# Patient Record
(demographics unavailable — no encounter records)

---

## 2024-10-15 NOTE — HISTORY OF PRESENT ILLNESS
[de-identified] : Hearing and vision screening [FreeTextEntry6] : Stuart was not cooperative with vision and hearing screenings at his 4 year [facility] visit in March 2024. Screenings were performed successfully today.  His vision is L 20/50, R 2040 and Both 10/40. Ophthalmology referral given for full exam. Hearing screen was WNL.  Screening test results were added to [facility] school form completed by Dr. Howell in March 2024. Copy of screening results and vaccine record [place] to [facility]

## 2025-03-27 NOTE — REASON FOR VISIT
[Routine Follow-Up] : a routine follow-up visit for [Anaphylaxis] : anaphylaxis [Allergy Evaluation/ Skin Testing] : allergy evaluation and or skin testing [Allergic Rhinitis] : allergic rhinitis [Allergic Conjunctivitis] : allergic conjunctivitis [Hay Fever] : hay fever [Congestion] : congestion [Runny Nose] : runny nose [Itchy Eyes] : itchy eyes [To Food] : allergy to food [Father] : father

## 2025-03-30 NOTE — REVIEW OF SYSTEMS
[Nasal Congestion] : nasal congestion [Snoring] : snoring [Dry Skin] : dry skin [Itching] : itching [Negative] : Genitourinary

## 2025-03-30 NOTE — DEVELOPMENTAL MILESTONES
[Normal Development] : Normal Development [None] : none [Dresses and undresses without help] : dresses and undresses without help [Goes to the bathroom independently] : goes to bathroom independently [Plays and interacts with peer] : plays and interacts with peer [Tells a story of 2 sentences or more] : tells a story of 2 sentences or more [Counts 5 objects] : counts 5 objects [Walks on tiptoes when asked] : walks on tiptoes when asked [Copies first name] : copies first name [Writes 2 or more letters] : writes 2 or more letters [Is dry through the day] :  is dry through the day [Answers "why" questions] : answers "why" questions [Catches a bounced ball with] : catches a bounced ball with 2 hands [No] : Not Completed. [Is beginning to skip] : is not beginning to skip [FreeTextEntry1] : hops and balances on one foot knows simple addition

## 2025-03-30 NOTE — HISTORY OF PRESENT ILLNESS
[whole ___ oz/d] : consumes [unfilled] oz of whole cow's milk per day [Fruit] : fruit [Vegetables] : vegetables [Meat] : meat [Dairy] : dairy [Normal] : Normal [___ stools per day] : [unfilled]  stools per day [Toilet Trained] :  toilet trained [Brushing teeth] : Brushing teeth [Yes] : Patient goes to dentist yearly [Toothpaste] : Primary Fluoride Source: Toothpaste [Appropiate parent-child-sibling interaction] : Appropriate parent-child-sibling interaction [Child Cooperates] : Child cooperates [Parent has appropriate responses to behavior] : Parent has appropriate responses to behavior [In Pre-K] : In Pre-K [Car seat in back seat] : Car seat in back seat [Up to date] : Up to date [Mother] : mother [No] : No cigarette smoke exposure [Supervised outdoor play] : Supervised outdoor play [Exposure to electronic nicotine delivery system] : No exposure to electronic nicotine delivery system [FreeTextEntry7] : no interval ER/UC visits; no ear infections in the last year [de-identified] : diet has improved significantly, eats 3 meals/day drinks juice 4 oz/day [FreeTextEntry3] : chronic loud snoring, no LISA sx [de-identified] : 1-2x/day [de-identified] : does well in school [de-identified] : uses helmet [FreeTextEntry1] :  Egg allergy: Passed supervised oral challenge to baked egg in 2023 No interval episodes of anaphylaxis  No other food allergies  Eczema: Vaseline and Vanicream moisturizers Topical steroids (TAC 0.1%) on body and Tacrolimus on face as needed for flares  Fragrance-free soap and detergent  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

## 2025-03-30 NOTE — HISTORY OF PRESENT ILLNESS
[whole ___ oz/d] : consumes [unfilled] oz of whole cow's milk per day [Fruit] : fruit [Vegetables] : vegetables [Meat] : meat [Dairy] : dairy [Normal] : Normal [___ stools per day] : [unfilled]  stools per day [Toilet Trained] :  toilet trained [Brushing teeth] : Brushing teeth [Yes] : Patient goes to dentist yearly [Toothpaste] : Primary Fluoride Source: Toothpaste [Appropiate parent-child-sibling interaction] : Appropriate parent-child-sibling interaction [Child Cooperates] : Child cooperates [Parent has appropriate responses to behavior] : Parent has appropriate responses to behavior [In Pre-K] : In Pre-K [Car seat in back seat] : Car seat in back seat [Up to date] : Up to date [Mother] : mother [No] : No cigarette smoke exposure [Supervised outdoor play] : Supervised outdoor play [Exposure to electronic nicotine delivery system] : No exposure to electronic nicotine delivery system [FreeTextEntry7] : no interval ER/UC visits; no ear infections in the last year [de-identified] : diet has improved significantly, eats 3 meals/day drinks juice 4 oz/day [FreeTextEntry3] : chronic loud snoring, no LISA sx [de-identified] : 1-2x/day [de-identified] : does well in school [de-identified] : uses helmet [FreeTextEntry1] :  Egg allergy: Passed supervised oral challenge to baked egg in 2023 No interval episodes of anaphylaxis  No other food allergies  Eczema: Vaseline and Vanicream moisturizers Topical steroids (TAC 0.1%) on body and Tacrolimus on face as needed for flares  Fragrance-free soap and detergent  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

## 2025-03-30 NOTE — PHYSICAL EXAM
[Alert] : alert [No Acute Distress] : no acute distress [Playful] : playful [Normocephalic] : normocephalic [PERRL] : PERRL [EOMI Bilateral] : EOMI bilateral [Auricles Well Formed] : auricles well formed [Clear Tympanic membranes with present light reflex and bony landmarks] : clear tympanic membranes with present light reflex and bony landmarks [No Discharge] : no discharge [Uvula Midline] : uvula midline [Nonerythematous Oropharynx] : nonerythematous oropharynx [Supple, full passive range of motion] : supple, full passive range of motion [No Palpable Masses] : no palpable masses [Symmetric Chest Rise] : symmetric chest rise [Clear to Auscultation Bilaterally] : clear to auscultation bilaterally [Normoactive Precordium] : normoactive precordium [Regular Rate and Rhythm] : regular rate and rhythm [Normal S1, S2 present] : normal S1, S2 present [No Murmurs] : no murmurs [NonTender] : non tender [Non Distended] : non distended [Normoactive Bowel Sounds] : normoactive bowel sounds [Kemal 1] : Kemal 1 [Uncircumcised] : uncircumcised [Central Urethral Opening] : central urethral opening [Testicles Descended Bilaterally] : testicles descended bilaterally [Soft] : soft [Non Tender] : non tender [Mobile] : mobile [< 1 cm Lymph Nodes Palpated in the following Regions:] : <1 cm lymph nodes palpated in the following regions: [Anterior Cervical] : anterior cervical [Posterior Cervical] : posterior cervical [Symmetric Hip Rotation] : symmetric hip rotation [No pain or deformities with palpation of bone, muscles, joints] : no pain or deformities with palpation of bone, muscles, joints [Normal Muscle Tone] : normal muscle tone [Straight] : straight [Conjunctivae with no discharge] : conjunctivae with no discharge [FreeTextEntry1] : adorable, conversant  [FreeTextEntry4] : pale nasal turbinates  [de-identified] : tonsils not enlarged [FreeTextEntry9] : 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

## 2025-03-30 NOTE — DISCUSSION/SUMMARY
[Normal Development] : development  [No Elimination Concerns] : elimination [Continue Regimen] : feeding [Poor Weight Gain] : poor weight gain [BMI ___] : body mass index of [unfilled] [Eczema] : eczema [No Vaccines] : no vaccines needed [No Medication Changes] : No medication changes at this time [Mother] : mother [Full Activity without restrictions including Physical Education & Athletics] : Full Activity without restrictions including Physical Education & Athletics [I have examined the above-named student and completed the preparticipation physical evaluation. The athlete does not present apparent clinical contraindications to practice and participate in sport(s) as outlined above. A copy of the physical exam is on r] : I have examined the above-named student and completed the preparticipation physical evaluation. The athlete does not present apparent clinical contraindications to practice and participate in sport(s) as outlined above. A copy of the physical exam is on record in my office and can be made available to the school at the request of the parents. If conditions arise after the athlete has been cleared for participation, the physician may rescind the clearance until the problem is resolved and the potential consequences are completely explained to the athlete (and parents/guardians). [FreeTextEntry1] :  Stephanie 5 year old boy with eczema, allergic rhinitis, egg allergy PMH of anaphylaxis to egg, passed supervised baked egg challenge in 2023, avoids lightly-cooked egg Followed by private ENT for recurrent AOM and snoring, no plan for surgery; no ear infections in the last year, normal hearing testing today Hx of poor weight gain and underweight BMI, likely due to inconsistent eating (though this has reportedly improved) Developing appropriately Ophtho referral (due to failed vision screen at last appt), normal vision acuity but reportedly dx with red-green color blindness Eczema is adequately controlled with current regimen as per Derm Evidence of allergic rhinitis on exam Exam notable for scattered erythematous subcutaneous nodules c/w furunculosis (recurrent per mother)  1) Health maintenance - Dietary counseling provided, incorporate healthy fats and calorie-dense foods  - Discussed child safety - Routine F/U with dentist and ophtho  - Routine CBC and lead testing - Recommend Flu & COVID vaccines in the fall  2) Eczema - [place] use of gentle emollients - [place] use of topical steroids and tacrolimus as needed for flares - Use mupirocin 3x/day PRN skin pustules/nodules - Recommend MRSA decolonization with mupirocin to nares 2x/day for 7-10 days - F/U with Derm  3) Egg allergy - Continue avoidance of lightly-cooked egg - F/U with Allergy (scheduled appt tomorrow) - Allergy MAF completed and provided to parent  4) Allergic rhinitis - Continue Zyrtec and Flonase PRN - F/U with Allergy  5) Recurrent AOM (no episodes in past 12 months) - F/U with private ENT  Return in 6 months for weight check and vaccines (Flu & COVID)

## 2025-03-30 NOTE — PHYSICAL EXAM
[Alert] : alert [No Acute Distress] : no acute distress [Playful] : playful [Normocephalic] : normocephalic [PERRL] : PERRL [EOMI Bilateral] : EOMI bilateral [Auricles Well Formed] : auricles well formed [Clear Tympanic membranes with present light reflex and bony landmarks] : clear tympanic membranes with present light reflex and bony landmarks [No Discharge] : no discharge [Uvula Midline] : uvula midline [Nonerythematous Oropharynx] : nonerythematous oropharynx [Supple, full passive range of motion] : supple, full passive range of motion [No Palpable Masses] : no palpable masses [Symmetric Chest Rise] : symmetric chest rise [Clear to Auscultation Bilaterally] : clear to auscultation bilaterally [Normoactive Precordium] : normoactive precordium [Regular Rate and Rhythm] : regular rate and rhythm [Normal S1, S2 present] : normal S1, S2 present [No Murmurs] : no murmurs [NonTender] : non tender [Non Distended] : non distended [Normoactive Bowel Sounds] : normoactive bowel sounds [Kemal 1] : Kemal 1 [Uncircumcised] : uncircumcised [Central Urethral Opening] : central urethral opening [Testicles Descended Bilaterally] : testicles descended bilaterally [Soft] : soft [Non Tender] : non tender [Mobile] : mobile [< 1 cm Lymph Nodes Palpated in the following Regions:] : <1 cm lymph nodes palpated in the following regions: [Anterior Cervical] : anterior cervical [Posterior Cervical] : posterior cervical [Symmetric Hip Rotation] : symmetric hip rotation [No pain or deformities with palpation of bone, muscles, joints] : no pain or deformities with palpation of bone, muscles, joints [Normal Muscle Tone] : normal muscle tone [Straight] : straight [Conjunctivae with no discharge] : conjunctivae with no discharge [FreeTextEntry1] : adorable, conversant  [FreeTextEntry4] : pale nasal turbinates  [de-identified] : tonsils not enlarged [FreeTextEntry9] : 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

## 2025-03-30 NOTE — DISCUSSION/SUMMARY
[Normal Development] : development  [No Elimination Concerns] : elimination [Continue Regimen] : feeding [Poor Weight Gain] : poor weight gain [BMI ___] : body mass index of [unfilled] [Eczema] : eczema [No Vaccines] : no vaccines needed [No Medication Changes] : No medication changes at this time [Mother] : mother [Full Activity without restrictions including Physical Education & Athletics] : Full Activity without restrictions including Physical Education & Athletics [I have examined the above-named student and completed the preparticipation physical evaluation. The athlete does not present apparent clinical contraindications to practice and participate in sport(s) as outlined above. A copy of the physical exam is on r] : I have examined the above-named student and completed the preparticipation physical evaluation. The athlete does not present apparent clinical contraindications to practice and participate in sport(s) as outlined above. A copy of the physical exam is on record in my office and can be made available to the school at the request of the parents. If conditions arise after the athlete has been cleared for participation, the physician may rescind the clearance until the problem is resolved and the potential consequences are completely explained to the athlete (and parents/guardians). [FreeTextEntry1] :  Stephanie 5 year old boy with eczema, allergic rhinitis, egg allergy PMH of anaphylaxis to egg, passed supervised baked egg challenge in 2023, avoids lightly-cooked egg Followed by private ENT for recurrent AOM and snoring, no plan for surgery; no ear infections in the last year, normal hearing testing today Hx of poor weight gain and underweight BMI, likely due to inconsistent eating (though this has reportedly improved) Developing appropriately Ophtho referral (due to failed vision screen at last appt), normal vision acuity but reportedly dx with red-green color blindness Eczema is adequately controlled with current regimen as per Derm Evidence of allergic rhinitis on exam Exam notable for scattered erythematous subcutaneous nodules c/w furunculosis (recurrent per mother)  1) Health maintenance - Dietary counseling provided, incorporate healthy fats and calorie-dense foods  - Discussed child safety - Routine F/U with dentist and ophtho  - Routine CBC and lead testing - Recommend Flu & COVID vaccines in the fall  2) Eczema - [place] use of gentle emollients - Sparing use of topical steroids and tacrolimus as needed for flares - Use mupirocin 3x/day PRN skin pustules/nodules - Recommend MRSA decolonization with mupirocin to nares 2x/day for 7-10 days - F/U with Derm  3) Egg allergy - Continue avoidance of lightly-cooked egg - F/U with Allergy (scheduled appt tomorrow) - Allergy MAF completed and provided to parent  4) Allergic rhinitis - Continue Zyrtec and Flonase PRN - F/U with Allergy  5) Recurrent AOM (no episodes in past 12 months) - F/U with private ENT  Return in 6 months for weight check and vaccines (Flu & COVID)

## 2025-03-31 NOTE — REVIEW OF SYSTEMS
[Immunizations are up to date] : Immunizations are up to date [Eye Itching] : itchy eyes [Nasal Congestion] : nasal congestion [Nasal Itching] : nasal itching [Sneezing] : sneezing [Atopic Dermatitis] : atopic dermatitis [Dry Skin] : ~L dry skin [Recurrent Skin Infections] : recurrent skin infections [Nl] : Gastrointestinal [Urticaria] : no urticaria [de-identified] : prone to furunculosis - 2 resolving spots currently

## 2025-03-31 NOTE — PHYSICAL EXAM
[Alert] : alert [Well Nourished] : well nourished [Healthy Appearance] : healthy appearance [No Acute Distress] : no acute distress [Normal Pupil & Iris Size/Symmetry] : normal pupil and iris size and symmetry [No Discharge] : no discharge [Sclera Not Icteric] : sclera not icteric [Normal TMs] : both tympanic membranes were normal [Normal Lips/Tongue] : the lips and tongue were normal [Normal Outer Ear/Nose] : the ears and nose were normal in appearance [Normal Tonsils] : normal tonsils [Boggy Nasal Turbinates] : boggy and/or pale nasal turbinates [Normal Rate and Effort] : normal respiratory rhythm and effort [Normal Rate] : heart rate was normal  [Normal S1, S2] : normal S1 and S2 [Skin Intact] : skin intact  [Patches] : ~M patches present [Alert, Awake, Oriented as Age-Appropriate] : alert, awake, oriented as age appropriate [Conjunctival Erythema] : no conjunctival erythema [Suborbital Bogginess] : no suborbital bogginess (allergic shiners) [Pharyngeal erythema] : no pharyngeal erythema [Posterior Pharyngeal Cobblestoning] : no posterior pharyngeal cobblestoning [Wheezing] : no wheezing was heard [Urticaria] : no urticaria

## 2025-03-31 NOTE — HISTORY OF PRESENT ILLNESS
[Asthma] : asthma [Cat] : cat [de-identified] : Stuart is a 5y M with IgE allergy to egg and seasonal rhino-conjunctivitis.      Food allergy: - no ER visits - possibly accidental exposure in fried food, does ok with few bites, no reactions - only avoiding egg - still tolerating baked egg in cookies, cake,   seasonal allergies: - symptoms including the itchy eyes, runny nose, congestion, eczema flares - started giving zyrtec for the spring season 2.5mL, is using flonase daily - used to do eye drops   eczema - been bad on hands during winter time but improving now with vaseline - also having furunculosis, followed by dermatology [de-identified] : egg

## 2025-03-31 NOTE — SOCIAL HISTORY
[Apartment] : [unfilled] lives in an apartment  [None] : none [Feather Pillows] : does not have feather pillows [Bedroom] : not in the bedroom [de-identified] : [place]

## 2025-03-31 NOTE — SOCIAL HISTORY
[Apartment] : [unfilled] lives in an apartment  [None] : none [Feather Pillows] : does not have feather pillows [Bedroom] : not in the bedroom [de-identified] : [place]

## 2025-03-31 NOTE — REVIEW OF SYSTEMS
[Immunizations are up to date] : Immunizations are up to date [Eye Itching] : itchy eyes [Nasal Congestion] : nasal congestion [Nasal Itching] : nasal itching [Sneezing] : sneezing [Atopic Dermatitis] : atopic dermatitis [Dry Skin] : ~L dry skin [Recurrent Skin Infections] : recurrent skin infections [Nl] : Gastrointestinal [Urticaria] : no urticaria [de-identified] : prone to furunculosis - 2 resolving spots currently

## 2025-03-31 NOTE — HISTORY OF PRESENT ILLNESS
[Asthma] : asthma [Cat] : cat [de-identified] : Stuart is a 5y M with IgE allergy to egg and seasonal rhino-conjunctivitis.      Food allergy: - no ER visits - possibly accidental exposure in fried food, does ok with few bites, no reactions - only avoiding egg - still tolerating baked egg in cookies, cake,   seasonal allergies: - symptoms including the itchy eyes, runny nose, congestion, eczema flares - started giving zyrtec for the spring season 2.5mL, is using flonase daily - used to do eye drops   eczema - been bad on hands during winter time but improving now with vaseline - also having furunculosis, followed by dermatology [de-identified] : egg

## 2025-04-14 NOTE — ASSESSMENT
[Use of independent historian: [ enter independent historian's relationship to patient ] :____] : As the patient was unable to provide a complete and reliable history, I obtained clinical history from the patient's [unfilled] [FreeTextEntry1] : #Favor Furunculosis, trunk chronic, flare - c/w CLN cleanser - can c/w mupirocin - recommend bacterial culture- discussed will schedule for in person appt to do so, but results may be false negative as we are already starting an abx and has used mupirocin - start keflex 30mg/kg (3.5mL of the 250mg/5mL = 3.5 mL TID) x 7 days, SED - does not appear as molluscum as pustules visible, but will confirm in person. tasked scheduling team for in person appt in 2-3 weeks  RTC 2-3 weeks  This visit was conducted via live synchronous audio/video telehealth with the patient and provider. The patient attested to being located in [facility] where the provider is also currently located and licensed to practice medicine. Verbal consent was obtained for this telemedicine encounter. Risks and benefits of using Telehealth services has been discussed with the patient. The patient has been given ample opportunity to discuss any questions regarding [facility]'s Telehealth services. All of the patient's questions were answered to his/her satisfaction.

## 2025-04-14 NOTE — HISTORY OF PRESENT ILLNESS
[FreeTextEntry1] : fu - pimples [de-identified] : 5 yr old M w hx of eczema and seasonal allergies last seen 1 year ago, here for eval of pimples- occuring for past 1.5 years, at , was recommended CLN wash. Saw his pcp who was concerned about mrsa, bacterial cx not done, but given mupirocin to apply to spots and to nares, without improvement. In the last week, many popped up around groin. Mom not sure if related to mold as bath toys with mold.   No fever/chills.   Hx Bumps on the face and body, intermittently, drain pus x 2 weeks, put mupirocin on 1 spot

## 2025-04-14 NOTE — PHYSICAL EXAM
[Alert] : alert [FreeTextEntry3] : General: well appearing person in nad, alert, pleasant Focused Skin Exam per patient preference:  lower abdomen and groin with scattered pustules and surrounding erythema

## 2025-05-01 NOTE — PHYSICAL EXAM
[Alert] : alert [FreeTextEntry3] : General: well appearing person in nad, alert, pleasant Focused Skin Exam per patient preference:  lower abdomen and groin with scattered hyperpigmented macules, no active pustules or papules Dorsal hands with eczematous plaques, excoriations

## 2025-05-01 NOTE — ASSESSMENT
[Use of independent historian: [ enter independent historian's relationship to patient ] :____] : As the patient was unable to provide a complete and reliable history, I obtained clinical history from the patient's [unfilled] [FreeTextEntry1] : #Favor Furunculosis, trunk chronic, improved - c/w CLN cleanser- increase use to every other day - can c/w mupirocin prn flare - no evidence of molluscum todday - no active lesions to culture, but likely MSSA (or strep) if response completely to keflex - s/p keflex x 7 days - if recurrent, consider 1 week per month decolonization with mupirocin and CLN  # Atopic derm, hands #xerosis chronic, flare - start mometasone ointment BID x 2-3 weeks, 1 week off, SED< not for face, groin armpits - follow with vaseline/emollients - gentle hand care - can use tacrolimus ointment as maintenance  RTC prn

## 2025-05-01 NOTE — HISTORY OF PRESENT ILLNESS
[FreeTextEntry1] : fu - pimples [de-identified] : 5 yr old M w hx of eczema and seasonal allergies here for f/u furunculosis, resolved s/p keflex. No active bumps.  Using CLN cleanser every 3 days. Also uses mupirocin.  Eczema on hands active, applies mupirocin then HC 2.5%. Finds mometasone makes it dry. Has vaseline as moisturizer  Hx- here for eval of pimples- occuring for past 1.5 years, at , was recommended CLN wash. Saw his pcp who was concerned about mrsa, bacterial cx not done, but given mupirocin to apply to spots and to nares, without improvement. In the last week, many popped up around groin. Mom not sure if related to mold as bath toys with mold.   No fever/chills.   Hx Bumps on the face and body, intermittently, drain pus x 2 weeks, put mupirocin on 1 spot

## 2025-07-25 NOTE — HISTORY OF PRESENT ILLNESS
[FreeTextEntry1] : FU:  pimples [de-identified] : 5 yr old M w hx of eczema and seasonal allergies here for f/u furunculosis. Getting bumps every other week, more frequent flares in the summer. Using CLN cleanser every day. Also uses mupirocin prn. Ok in winter.  Eczema on hands active, using mometasone prn. Has vaseline as moisturizer  Hx- here for eval of pimples- occuring for past 1.5 years, at , was recommended CLN wash. Saw his pcp who was concerned about mrsa, bacterial cx not done, but given mupirocin to apply to spots and to nares, without improvement. In the last week, many popped up around groin. Mom not sure if related to mold as bath toys with mold.   No fever/chills.

## 2025-07-25 NOTE — PHYSICAL EXAM
[Alert] : alert [FreeTextEntry3] : General: well appearing person in nad, alert, pleasant Focused Skin Exam per patient preference:  lower abdomen, groin, and upper thighs with pink papules to papulonodules with overlying crusting. Dorsal hands with eczematous plaques, excoriations

## 2025-07-25 NOTE — ASSESSMENT
[Use of independent historian: [ enter independent historian's relationship to patient ] :____] : As the patient was unable to provide a complete and reliable history, I obtained clinical history from the patient's [unfilled] [FreeTextEntry1] : #Furunculosis, trunk chronic, flaring - Discussed treatment options. Discussed that improved control of atopic derm may help decrease flares, can consider dupixent if recurrent. - c/w CLN cleanser daily - can c/w mupirocin prn flares and START mupirocin BID to nares and umbilicus every 1st week of the month for decolonization. - Bacterial culture obtained today, will follow results - START Keflex (30 mg/kg/day dosing) 3.5 ml TID x 7 days  # Atopic derm, hands #xerosis chronic, flare - continue mometasone ointment BID x 2-3 weeks, 1 week off, SED< not for face, groin armpits - discussed possibly starting dupixent to help with above. Eczema currently mild so would prefer to try to control with regular decolonization than dupi if possible - follow with vaseline/emollients - gentle hand care - can use tacrolimus ointment as maintenance  RTC 2-3 months